# Patient Record
Sex: MALE | Race: OTHER | NOT HISPANIC OR LATINO | URBAN - METROPOLITAN AREA
[De-identification: names, ages, dates, MRNs, and addresses within clinical notes are randomized per-mention and may not be internally consistent; named-entity substitution may affect disease eponyms.]

---

## 2023-07-28 ENCOUNTER — EMERGENCY (EMERGENCY)
Facility: HOSPITAL | Age: 23
LOS: 1 days | Discharge: HOME | End: 2023-07-28
Attending: EMERGENCY MEDICINE | Admitting: EMERGENCY MEDICINE
Payer: MEDICAID

## 2023-07-28 VITALS
DIASTOLIC BLOOD PRESSURE: 87 MMHG | TEMPERATURE: 98 F | OXYGEN SATURATION: 100 % | SYSTOLIC BLOOD PRESSURE: 147 MMHG | RESPIRATION RATE: 16 BRPM | HEART RATE: 89 BPM

## 2023-07-28 LAB
ALBUMIN SERPL ELPH-MCNC: 5 G/DL — SIGNIFICANT CHANGE UP (ref 3.3–5)
ALP SERPL-CCNC: 89 U/L — SIGNIFICANT CHANGE UP (ref 40–120)
ALT FLD-CCNC: 13 U/L — SIGNIFICANT CHANGE UP (ref 4–41)
ANION GAP SERPL CALC-SCNC: 12 MMOL/L — SIGNIFICANT CHANGE UP (ref 7–14)
AST SERPL-CCNC: 18 U/L — SIGNIFICANT CHANGE UP (ref 4–40)
BILIRUB SERPL-MCNC: 0.3 MG/DL — SIGNIFICANT CHANGE UP (ref 0.2–1.2)
BUN SERPL-MCNC: 17 MG/DL — SIGNIFICANT CHANGE UP (ref 7–23)
CALCIUM SERPL-MCNC: 10.2 MG/DL — SIGNIFICANT CHANGE UP (ref 8.4–10.5)
CHLORIDE SERPL-SCNC: 99 MMOL/L — SIGNIFICANT CHANGE UP (ref 98–107)
CO2 SERPL-SCNC: 27 MMOL/L — SIGNIFICANT CHANGE UP (ref 22–31)
CREAT SERPL-MCNC: 0.83 MG/DL — SIGNIFICANT CHANGE UP (ref 0.5–1.3)
D DIMER BLD IA.RAPID-MCNC: <150 NG/ML DDU — SIGNIFICANT CHANGE UP
EGFR: 128 ML/MIN/1.73M2 — SIGNIFICANT CHANGE UP
GLUCOSE SERPL-MCNC: 85 MG/DL — SIGNIFICANT CHANGE UP (ref 70–99)
HCT VFR BLD CALC: 45.1 % — SIGNIFICANT CHANGE UP (ref 39–50)
HGB BLD-MCNC: 14.8 G/DL — SIGNIFICANT CHANGE UP (ref 13–17)
MAGNESIUM SERPL-MCNC: 2 MG/DL — SIGNIFICANT CHANGE UP (ref 1.6–2.6)
MCHC RBC-ENTMCNC: 28.6 PG — SIGNIFICANT CHANGE UP (ref 27–34)
MCHC RBC-ENTMCNC: 32.8 GM/DL — SIGNIFICANT CHANGE UP (ref 32–36)
MCV RBC AUTO: 87.1 FL — SIGNIFICANT CHANGE UP (ref 80–100)
NRBC # BLD: 0 /100 WBCS — SIGNIFICANT CHANGE UP (ref 0–0)
NRBC # FLD: 0 K/UL — SIGNIFICANT CHANGE UP (ref 0–0)
PCP SPEC-MCNC: SIGNIFICANT CHANGE UP
PHOSPHATE SERPL-MCNC: 3.9 MG/DL — SIGNIFICANT CHANGE UP (ref 2.5–4.5)
PLATELET # BLD AUTO: 469 K/UL — HIGH (ref 150–400)
POTASSIUM SERPL-MCNC: 4.2 MMOL/L — SIGNIFICANT CHANGE UP (ref 3.5–5.3)
POTASSIUM SERPL-SCNC: 4.2 MMOL/L — SIGNIFICANT CHANGE UP (ref 3.5–5.3)
PROT SERPL-MCNC: 8.9 G/DL — HIGH (ref 6–8.3)
RBC # BLD: 5.18 M/UL — SIGNIFICANT CHANGE UP (ref 4.2–5.8)
RBC # FLD: 11.7 % — SIGNIFICANT CHANGE UP (ref 10.3–14.5)
SODIUM SERPL-SCNC: 138 MMOL/L — SIGNIFICANT CHANGE UP (ref 135–145)
TOXICOLOGY SCREEN, DRUGS OF ABUSE, SERUM RESULT: SIGNIFICANT CHANGE UP
TROPONIN T, HIGH SENSITIVITY RESULT: <6 NG/L — SIGNIFICANT CHANGE UP
TSH SERPL-MCNC: 4.17 UIU/ML — SIGNIFICANT CHANGE UP (ref 0.27–4.2)
WBC # BLD: 12.81 K/UL — HIGH (ref 3.8–10.5)
WBC # FLD AUTO: 12.81 K/UL — HIGH (ref 3.8–10.5)

## 2023-07-28 PROCEDURE — 99285 EMERGENCY DEPT VISIT HI MDM: CPT

## 2023-07-28 PROCEDURE — 93010 ELECTROCARDIOGRAM REPORT: CPT

## 2023-07-28 RX ORDER — SERTRALINE 25 MG/1
1 TABLET, FILM COATED ORAL
Qty: 30 | Refills: 0
Start: 2023-07-28 | End: 2023-08-26

## 2023-07-28 NOTE — ED ADULT NURSE NOTE - NSFALLUNIVINTERV_ED_ALL_ED
Bed/Stretcher in lowest position, wheels locked, appropriate side rails in place/Call bell, personal items and telephone in reach/Instruct patient to call for assistance before getting out of bed/chair/stretcher/Non-slip footwear applied when patient is off stretcher/[place] to call system/Physically safe environment - no spills, clutter or unnecessary equipment/Purposeful proactive rounding/Room/bathroom lighting operational, light cord in reach

## 2023-07-28 NOTE — ED PROVIDER NOTE - NSFOLLOWUPINSTRUCTIONS_ED_ALL_ED_FT
You were seen for possible anxiety disorder.    For treatment and/or connection to a community Psychiatrist, follow up at the [facility]: Monday - Friday between 9 AM - 3 PM at [address] [place] [id number], [phone].    Or, call for an appointment with:     Arnold Watkins (psychologist)  [address] [place] [address] (Lower Level 2)  [place] [address] [phone]     Return if symptoms worsen. You were seen for possible anxiety disorder.    You will be called by the [facility] ED Physician Assistant with results of relevant test results pending when you left the ED: plasma and urine metanephrines.    For treatment and/or connection to a community Psychiatrist, follow up at the [facility]: Monday - Friday between 9 AM - 3 PM at [address] [place] [id number], [phone].    Or, call for an appointment with:     Arnold Watkins (psychologist)  [address] [place] [address] (Lower Level 2)  [place] [address] [phone]     Return if symptoms worsen.
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
## 2023-07-28 NOTE — ED PROVIDER NOTE - PROGRESS NOTE DETAILS
Albino: Lab results unremarkable. Mildly-elevated serum WBC. No F/chills/localizing S/Sx of infection. Albino: Pt is very interested in getting a medication to help him with this daily anxiety. Will avoid benzos. Start low-dose Zoloft.

## 2023-07-28 NOTE — ED ADULT TRIAGE NOTE - CHIEF COMPLAINT QUOTE
c/o intermittent palpitations, abdominal pain , nausea, diarrhea , anxiety x 1 week. Denies PHx. Pt was seen at [facility] on 7/23 for dehydration. But even after discharge was still getting same symptoms intermittently.

## 2023-07-28 NOTE — ED PROVIDER NOTE - EKG ADDITIONAL INFORMATION FREE TEXT
Albino: I viewed the EKG myself. My interpretation of the EKG: No hyper-acute T waves, no malignant dysrhythmia, no ischemic ST segment changes.

## 2023-07-28 NOTE — ED ADULT NURSE NOTE - CHIEF COMPLAINT QUOTE
c/o intermittent palpitations, abdominal pain , nausea, diarrhea , anxiety x 1 week. Denies PHx. Pt was seen at [facility] on 7/23 for dehydration. But even after discharge was still getting same symptoms intermittently.

## 2023-07-28 NOTE — ED PROVIDER NOTE - PHYSICAL EXAMINATION
Well-appearing, well nourished, awake, alert, oriented to person, place, time/situation and in no apparent distress.    Airway patent. Neck supple.    Eyes without scleral injection. No jaundice.    Strong pulse. No M/R/G.     Respirations unlabored. Lungs clear in all anterior and posterior lung fields. No accessory muscle use. No barrel chest.     Abdomen soft, non-tender, no guarding.    MSK: Spine appears normal, range of motion is not limited, no muscle or joint tenderness. No LE edema.     Alert and oriented, no gross motor or sensory deficits.    Skin: normal color for race, warm, dry and intact. No evidence of rash.    No SI/HI.

## 2023-07-28 NOTE — ED PROVIDER NOTE - CLINICAL SUMMARY MEDICAL DECISION MAKING FREE TEXT BOX
Albino: Will check psychiatric-related labs, TSH, and an EKG and refer the patient to [facility].

## 2023-07-28 NOTE — ED PROVIDER NOTE - PATIENT PORTAL LINK FT
You can access the FollowMyHealth Patient Portal offered by [facility] by registering at the following website: [email or web address]. By joining [employer]s FollowMyHealth portal, you will also be able to view your health information using other applications (apps) compatible with our system.

## 2023-08-02 LAB
CREATININE, RNDM UR: 305.5 MG/DL — SIGNIFICANT CHANGE UP
METANEPHS 24H UR-MCNC: 0.2 — SIGNIFICANT CHANGE UP (ref 0–1)
METANEPHS 24H UR-MCNC: 182 UG/L — SIGNIFICANT CHANGE UP
NORMETANEPHRINE.: 356 UG/L — SIGNIFICANT CHANGE UP

## 2023-08-03 LAB
METANEPHRINE, PL: 26.3 PG/ML — SIGNIFICANT CHANGE UP (ref 0–88)
NORMETANEPHRINE, PL: 124.9 PG/ML — SIGNIFICANT CHANGE UP (ref 0–210.1)
